# Patient Record
Sex: MALE | URBAN - METROPOLITAN AREA
[De-identification: names, ages, dates, MRNs, and addresses within clinical notes are randomized per-mention and may not be internally consistent; named-entity substitution may affect disease eponyms.]

---

## 2022-04-11 ENCOUNTER — HISTORICAL (OUTPATIENT)
Dept: ADMINISTRATIVE | Facility: HOSPITAL | Age: 42
End: 2022-04-11

## 2022-04-29 VITALS
DIASTOLIC BLOOD PRESSURE: 86 MMHG | SYSTOLIC BLOOD PRESSURE: 130 MMHG | WEIGHT: 248.25 LBS | HEIGHT: 70 IN | OXYGEN SATURATION: 98 % | BODY MASS INDEX: 35.54 KG/M2

## 2024-12-23 ENCOUNTER — OFFICE VISIT (OUTPATIENT)
Dept: URGENT CARE | Facility: CLINIC | Age: 44
End: 2024-12-23
Payer: COMMERCIAL

## 2024-12-23 VITALS
WEIGHT: 248 LBS | BODY MASS INDEX: 35.5 KG/M2 | TEMPERATURE: 100 F | OXYGEN SATURATION: 100 % | SYSTOLIC BLOOD PRESSURE: 132 MMHG | HEIGHT: 70 IN | DIASTOLIC BLOOD PRESSURE: 83 MMHG | RESPIRATION RATE: 18 BRPM | HEART RATE: 89 BPM

## 2024-12-23 DIAGNOSIS — R05.9 COUGH, UNSPECIFIED TYPE: ICD-10-CM

## 2024-12-23 DIAGNOSIS — J11.1 INFLUENZA: Primary | ICD-10-CM

## 2024-12-23 DIAGNOSIS — R52 BODY ACHES: ICD-10-CM

## 2024-12-23 LAB
CTP QC/QA: YES
MOLECULAR STREP A: NEGATIVE
POC MOLECULAR INFLUENZA A AGN: POSITIVE
POC MOLECULAR INFLUENZA B AGN: NEGATIVE
SARS-COV-2 AG RESP QL IA.RAPID: NEGATIVE

## 2024-12-23 RX ORDER — PANTOPRAZOLE SODIUM 40 MG/1
1 TABLET, DELAYED RELEASE ORAL EVERY MORNING
COMMUNITY
Start: 2024-10-10

## 2024-12-23 RX ORDER — OSELTAMIVIR PHOSPHATE 75 MG/1
75 CAPSULE ORAL 2 TIMES DAILY
Qty: 10 CAPSULE | Refills: 0 | Status: SHIPPED | OUTPATIENT
Start: 2024-12-23 | End: 2024-12-28

## 2024-12-23 RX ORDER — FLUOXETINE 10 MG/1
1 CAPSULE ORAL EVERY MORNING
COMMUNITY
Start: 2024-11-06

## 2024-12-23 RX ORDER — BALOXAVIR MARBOXIL 80 MG/1
80 TABLET, FILM COATED ORAL ONCE
Qty: 1 TABLET | Refills: 0 | Status: SHIPPED | OUTPATIENT
Start: 2024-12-23 | End: 2024-12-23

## 2024-12-23 RX ORDER — CLONAZEPAM 0.5 MG/1
0.5 TABLET ORAL 2 TIMES DAILY PRN
COMMUNITY
Start: 2024-11-06

## 2024-12-23 RX ORDER — ASPIRIN 81 MG/1
1 TABLET ORAL EVERY MORNING
COMMUNITY

## 2024-12-23 RX ORDER — DILTIAZEM HYDROCHLORIDE 180 MG/1
1 CAPSULE, COATED, EXTENDED RELEASE ORAL EVERY MORNING
COMMUNITY
Start: 2024-10-10 | End: 2025-10-10

## 2024-12-23 RX ORDER — LISINOPRIL 10 MG/1
1 TABLET ORAL EVERY MORNING
COMMUNITY
Start: 2024-10-10

## 2024-12-23 RX ORDER — TESTOSTERONE CYPIONATE 200 MG/ML
INJECTION, SOLUTION INTRAMUSCULAR
COMMUNITY

## 2024-12-23 NOTE — PROGRESS NOTES
"Subjective:      Patient ID: Piter Garcia is a 44 y.o. male.    Vitals:  height is 5' 10.08" (1.78 m) and weight is 112.5 kg (248 lb). His oral temperature is 99.8 °F (37.7 °C). His blood pressure is 132/83 and his pulse is 89. His respiration is 18 and oxygen saturation is 100%.     Chief Complaint: No chief complaint on file.     Patient is a 44 y.o. male who presents to urgent care with complaints of cough(productive), chest and back soreness, body aches x3 days. Alleviating factors include Theraflu with no relief. Patient denies shortness or breath, vomiting and diarrhea..        Constitution: Negative.   HENT:  Positive for congestion.    Neck: neck negative.   Cardiovascular: Negative.    Eyes: Negative.    Respiratory:  Positive for cough.    Gastrointestinal: Negative.    Genitourinary: Negative.    Musculoskeletal: Negative.    Skin: Negative.    Allergic/Immunologic: Negative.    Neurological: Negative.    Hematologic/Lymphatic: Negative.       Objective:     Physical Exam   Constitutional: He is oriented to person, place, and time. He appears well-developed. He is cooperative.  Non-toxic appearance. He does not appear ill. No distress.   HENT:   Head: Normocephalic and atraumatic.   Ears:   Right Ear: Hearing and external ear normal.   Left Ear: Hearing and external ear normal.   Mouth/Throat: Mucous membranes are normal.   Eyes: Conjunctivae and lids are normal.   Neck: Trachea normal and phonation normal. Neck supple. No edema present. No erythema present. No neck rigidity present.   Cardiovascular: Normal rate, regular rhythm and normal heart sounds.   Pulmonary/Chest: Effort normal and breath sounds normal. No stridor. No respiratory distress. He has no decreased breath sounds. He has no wheezes. He has no rhonchi. He has no rales.   Abdominal: Normal appearance.   Neurological: He is alert and oriented to person, place, and time. He exhibits normal muscle tone.   Skin: Skin is warm, intact and not " "diaphoretic.   Psychiatric: His speech is normal and behavior is normal. Mood, judgment and thought content normal.   Nursing note and vitals reviewed.       Previous History      Review of patient's allergies indicates:  No Known Allergies    History reviewed. No pertinent past medical history.  Current Outpatient Medications   Medication Instructions    aspirin (ECOTRIN) 81 MG EC tablet 1 tablet, Every morning    clonazePAM (KLONOPIN) 0.5 mg, 2 times daily PRN    diltiaZEM (CARDIZEM CD) 180 MG 24 hr capsule 1 capsule, Every morning    FLUoxetine 10 MG capsule 1 capsule, Every morning    lisinopriL 10 MG tablet 1 tablet, Every morning    oseltamivir (TAMIFLU) 75 mg, Oral, 2 times daily    pantoprazole (PROTONIX) 40 MG tablet 1 tablet, Every morning    testosterone cypionate (DEPOTESTOTERONE CYPIONATE) 200 mg/mL injection SMARTSI.75 Milliliter(s) IM Once a Week    XOFLUZA 80 mg, Oral, Once     History reviewed. No pertinent surgical history.  No family history on file.    Social History     Tobacco Use    Smoking status: Never    Smokeless tobacco: Never        Physical Exam      Vital Signs Reviewed   /83 (BP Location: Left arm, Patient Position: Sitting)   Pulse 89   Temp 99.8 °F (37.7 °C) (Oral)   Resp 18   Ht 5' 10.08" (1.78 m)   Wt 112.5 kg (248 lb)   SpO2 100%   BMI 35.50 kg/m²        Procedures    Procedures     Labs     Results for orders placed or performed in visit on 24   POCT Influenza A/B MOLECULAR    Collection Time: 24 10:29 AM   Result Value Ref Range    POC Molecular Influenza A Ag Positive (A) Negative    POC Molecular Influenza B Ag Negative Negative     Acceptable Yes    POCT Strep A, Molecular    Collection Time: 24 10:32 AM   Result Value Ref Range    Molecular Strep A, POC Negative Negative     Acceptable Yes          Assessment:     1. Influenza    2. Cough, unspecified type    3. Body aches        Plan:   Flu A " positive  Medications sent to pharmacy  Xofluza is preferred flu medication to take if covered by insurance  Increase fluid intake. Monitor for fever. Take tylenol/acetaminophen or advil/ibuprofen as needed for headache, bodyaches or fever.   Treat your symptoms like the common cold, take Delysm/dimetapp/robitussin as needed for cough, claritin, flonase, mucinex for congestion, for example.   Complications for flu include pneumonia, bronchitis, and sinusitis.   Stay home until you are fever free for at least 24 hours without the use of fever reducing medication and your symptoms have improved.   If your symptoms worsen, or you develop shortness of breath, worsening of cough, or fever over 103, seek medical attention immediately.       Influenza    Cough, unspecified type  -     SARS Coronavirus 2 Antigen, POCT Manual Read  -     POCT Influenza A/B MOLECULAR  -     POCT Strep A, Molecular    Body aches  -     SARS Coronavirus 2 Antigen, POCT Manual Read  -     POCT Influenza A/B MOLECULAR  -     POCT Strep A, Molecular    Other orders  -     oseltamivir (TAMIFLU) 75 MG capsule; Take 1 capsule (75 mg total) by mouth 2 (two) times daily. for 5 days  Dispense: 10 capsule; Refill: 0  -     baloxavir marboxiL (XOFLUZA) 80 mg tablet; Take 1 tablet (80 mg total) by mouth once. for 1 dose  Dispense: 1 tablet; Refill: 0

## 2024-12-23 NOTE — PATIENT INSTRUCTIONS
Plan:   Flu A positive  Medications sent to pharmacy  Xofluza is preferred flu medication to take if covered by insurance  Increase fluid intake. Monitor for fever. Take tylenol/acetaminophen or advil/ibuprofen as needed for headache, bodyaches or fever.   Treat your symptoms like the common cold, take Delysm/dimetapp/robitussin as needed for cough, claritin, flonase, mucinex for congestion, for example.   Complications for flu include pneumonia, bronchitis, and sinusitis.   Stay home until you are fever free for at least 24 hours without the use of fever reducing medication and your symptoms have improved.   If your symptoms worsen, or you develop shortness of breath, worsening of cough, or fever over 103, seek medical attention immediately.